# Patient Record
Sex: FEMALE | Race: WHITE | NOT HISPANIC OR LATINO | URBAN - METROPOLITAN AREA
[De-identification: names, ages, dates, MRNs, and addresses within clinical notes are randomized per-mention and may not be internally consistent; named-entity substitution may affect disease eponyms.]

---

## 2023-02-07 NOTE — PROGRESS NOTES
Would like to discuss birth control  [facility] and would [facility]    LMP: 2023   Menarche age: 14    BC: Would like to discuss OCP (Eric Tolentino)

## 2023-02-08 ENCOUNTER — OFFICE VISIT (OUTPATIENT)
Dept: OBGYN CLINIC | Age: 23
End: 2023-02-08

## 2023-02-08 VITALS
BODY MASS INDEX: 17.86 KG/M2 | DIASTOLIC BLOOD PRESSURE: 68 MMHG | HEIGHT: 61 IN | SYSTOLIC BLOOD PRESSURE: 102 MMHG | WEIGHT: 94.6 LBS

## 2023-02-08 DIAGNOSIS — Z30.41 ENCOUNTER FOR SURVEILLANCE OF CONTRACEPTIVE PILLS: ICD-10-CM

## 2023-02-08 DIAGNOSIS — Z30.09 BIRTH CONTROL COUNSELING: Primary | ICD-10-CM

## 2023-02-08 RX ORDER — NORETHINDRONE ACETATE AND ETHINYL ESTRADIOL 1MG-20(21)
1 KIT ORAL DAILY
COMMUNITY
End: 2023-02-08 | Stop reason: SDUPTHER

## 2023-02-08 RX ORDER — NORETHINDRONE ACETATE AND ETHINYL ESTRADIOL 1MG-20(21)
1 KIT ORAL DAILY
Qty: 84 TABLET | Refills: 0 | Status: SHIPPED | OUTPATIENT
Start: 2023-02-08

## 2023-02-08 NOTE — PROGRESS NOTES
Assessment/Plan:    Encounter for surveillance of contraceptive pills  -Discussed BC options  Patient is happy with her current pills and would like to continue  Discussed directions of use, risks, benefits, and SEs of OCPs  ACHES reviewed   -advised to take pills at the same time every day and not to miss doses  -f/u in 3 months for pill check and pap  -HPV vaccine recommended if not already completed  Diagnoses and all orders for this visit:    Birth control counseling    Encounter for surveillance of contraceptive pills  -     norethindrone-ethinyl estradiol (Tarina FE 1/20) 1-20 MG-MCG per tablet; Take 1 tablet by mouth daily       Subjective:      Patient ID: Monroe Jensen is a 25 y o  female here for contraception counseling  [facility] and would [facility]  She has been on OCPs for 1 month now  Sexually active  Declined STD screening today  Her menstrual cycles are regular without issue  She is a current every day smoker (vapes) with nicotine cartridge  Personal and family history were reviewed and she is without any additional VTE risk factors  She has never had a pap smear  Unsure if she ever completed the Gardasil vaccine series  Patient's last menstrual period was 02/04/2023 (exact date)  The following portions of the patient's history were reviewed and updated as appropriate:   She  has no past medical history on file  She   Patient Active Problem List    Diagnosis Date Noted   • Encounter for surveillance of contraceptive pills 02/08/2023     She  has a past surgical history that includes Foot surgery (Right)  Her family history is not on file  She  reports that she has never smoked  She has never used smokeless tobacco  She reports current alcohol use  She reports that she does not use drugs    Current Outpatient Medications   Medication Sig Dispense Refill   • norethindrone-ethinyl estradiol (Tarina FE 1/20) 1-20 MG-MCG per tablet Take 1 tablet by mouth daily 84 tablet 0     No current facility-administered medications for this visit  She has No Known Allergies       Review of Systems   Constitutional: Negative for chills and fever  Respiratory: Negative for shortness of breath  Cardiovascular: Negative for chest pain and leg swelling  Gastrointestinal: Negative for abdominal pain  Genitourinary: Negative for menstrual problem and pelvic pain  Musculoskeletal: Negative for back pain and myalgias  Skin: Negative for pallor and rash  Neurological: Negative for dizziness, light-headedness and headaches  Hematological: Negative for adenopathy  Psychiatric/Behavioral: Negative for confusion  Objective:      /68 (BP Location: Left arm, Patient Position: Sitting, Cuff Size: Standard)   Ht 5' 1" (1 549 m)   Wt 42 9 kg (94 lb 9 6 oz)   LMP 02/04/2023 (Exact Date)   BMI 17 87 kg/m²          Physical Exam  Vitals and nursing note reviewed  Constitutional:       General: She is not in acute distress  Appearance: Normal appearance  She is not ill-appearing  HENT:      Head: Normocephalic and atraumatic  Eyes:      Conjunctiva/sclera: Conjunctivae normal    Pulmonary:      Effort: Pulmonary effort is normal    Abdominal:      Palpations: Abdomen is soft  Tenderness: There is no abdominal tenderness  Musculoskeletal:         General: Normal range of motion  Cervical back: Neck supple  Skin:     General: Skin is warm and dry  Neurological:      General: No focal deficit present  Mental Status: She is alert     Psychiatric:         Mood and Affect: Mood normal          Behavior: Behavior normal

## 2023-02-08 NOTE — ASSESSMENT & PLAN NOTE
-Discussed BC options  Patient is happy with her current pills and would like to continue  Discussed directions of use, risks, benefits, and SEs of OCPs  ACHES reviewed   -advised to take pills at the same time every day and not to miss doses  -f/u in 3 months for pill check and pap  -HPV vaccine recommended if not already completed

## 2023-02-08 NOTE — PATIENT INSTRUCTIONS
Birth Control Pills   WHAT YOU NEED TO KNOW:   What are birth control pills? Birth control pills are also called oral contraceptives, or the pill  It is medicine that helps prevent pregnancy by stopping ovulation  Ovulation is when the ovaries make and release an egg cell each month  If this egg gets fertilized by sperm, pregnancy occurs  You will need to take the pill at the same time every day  Your healthcare provider will tell you when to start taking the pill  You will also be told what to do if you miss a dose  Instructions will depend on the kind of birth control pills you are taking  What are the different kinds of birth control pills? Some kinds are taken for 21 days in a row, followed by 7 days of placebo (no hormones) pills  Other kinds are taken for 24 days followed by 4 days of placebos  Each kind has a certain amount of female hormones  Your provider will decide on the kind that is best for you based on your age and other health conditions  What may be done before I can start taking birth control pills? You need to see your healthcare provider to get a prescription  Any of the following may be done before your healthcare provider gives you a prescription:  Your healthcare provider will ask about diseases and illnesses you have had in the past  Your provider will check your risk for blood clots, heart conditions, or stroke  Tell your provider if you had gastric bypass surgery  This surgery can affect the way your body absorbs medicines such as birth control pills  Your provider will also check your blood pressure, and may do a breast and pelvic exam  A Pap smear may also be done during the pelvic exam  This is a test to make sure you do not have abnormal changes on your cervix  You may need other tests, such as a urine test to make sure you are not pregnant  Your provider will ask if you take any medicines and if you smoke   Smoking increases your risk for stroke, heart attack, or a blood clot in your lungs  If you smoke, you should not take certain kinds of birth control pills  What are the advantages of birth control pills? When birth control pills are used correctly, the chances of getting pregnant are very low  Birth control pills may help decrease bleeding and pain during your monthly period  They may also help prevent cancer of the uterus and ovaries  What are the disadvantages of birth control pills? You may have sudden changes in your mood or feelings while you take birth control pills  You may have nausea and a decreased sex drive  You may have an increased appetite and rapid weight gain  You may also have bleeding in between periods, less frequent periods, vaginal dryness, and breast pain  Birth control pills will not protect you from sexually transmitted infections  Rarely, some birth control pills can increase your risk for a blood clot  This may become life-threatening  What should I do if I decide I want to get pregnant? If you are planning to have a baby, ask your healthcare provider when you may stop taking your birth control pills  It may take some time for you to start ovulating again  Ask your healthcare provider for more information about pregnancy after birth control pills  When should I start taking birth control pills after I have a baby? If you are not breastfeeding, you may start taking birth control pills 3 weeks after you give birth  You may be able to take certain types of birth control pills if you are breastfeeding  These pills can be started from 6 weeks to 6 months after you give birth  Ask your healthcare provider for more information about when to start taking birth control pills after you give birth  What do I need to know about birth control pills and menopause? Talk with your healthcare provider if you want to take birth control pills around menopause  Around age 39, you will enter into perimenopause   This means your hormone levels are dropping and you are ovulating less often  You can still become pregnant during this time  The risk for problems, such as miscarriage, are higher if you become pregnant after age 39  Birth control pills will prevent pregnancy, and may also help prevent or relieve some signs and symptoms of menopause  Examples are hot flashes and mood swings  Your provider will do tests when you are around age 48  The tests may show that you are in menopause  If the tests do not show menopause for sure, you may be able to continue taking the pill up to age 54  The decision will depend on your health and if you have any medical conditions, such as a blood clot  Call your local emergency number (911 in the [address]) for any of the following: You have any of the following signs of a stroke:      Numbness or drooping on one side of your face     Weakness in an arm or leg    Confusion or difficulty speaking    Dizziness, a severe headache, or vision loss    You feel lightheaded, short of breath, and have chest pain  You cough up blood  When should I seek immediate care? Your arm or leg feels warm, tender, and painful  It may look swollen and red  You have severe pain, numbness, or swelling in your arms or legs  When should I call my doctor? You have forgotten to take a birth control pill  You have mood changes, such as depression, since starting birth control pills  You have nausea or are vomiting  You have severe abdominal pain  You missed a period and have questions or concerns about being pregnant  You still have bleeding 4 months after taking birth control pills correctly  You have questions or concerns about your condition or care  CARE AGREEMENT:   You have the right to help plan your care  Learn about your health condition and how it may be treated  Discuss treatment options with your healthcare providers to decide what care you want to receive  You always have the right to refuse treatment   The above information is an  only  It is not intended as medical advice for individual conditions or treatments  Talk to your doctor, nurse or pharmacist before following any medical regimen to see if it is safe and effective for you  © Copyright [employer] 2022 Information is for End User's use only and may not be sold, redistributed or otherwise used for commercial purposes  All illustrations and images included in CareNotes® are the copyrighted property of [employer]  or [employer]  HPV (Human Papillomavirus)   AMBULATORY CARE:   Human Papillomavirus (HPV)  is the most common infection spread by sexual contact  It can also be spread from a mother to her baby during delivery  HPV may cause oral and genital warts or tumors in your nose, mouth, throat, and lungs  HPV may also cause vaginal, penile, and anal cancers  You may not show symptoms of any of these conditions for several years after being exposed to HPV  Common symptoms include the following:   Painless warts    Genital or anal discharge, bleeding, itching, or pain    Pain when you urinate    Call your doctor if:   You have warts in your genital or anal area  You have genital or anal discharge, bleeding, itching, or pain  You have pain when you urinate  You have questions or concerns about your condition or care  HPV diagnosis:  Your healthcare provider may use a vinegar liquid to help diagnose HPV genital warts  Women 27to 72years old can be checked for HPV during regular cervical cancer screenings  An HPV test checks for certain types of HPV that can cause changes in cervical cells  Without treatment, the changed cells can become cancer  An HPV test can be done every 5 years if the results show no infection  The test can be done with or without a Pap smear  A Pap smear checks for cancer or for abnormal cells that can become cancer   You may be tested for HPV if you are diagnosed with a mouth or throat cancer  Treatment:  HPV cannot be cured  Conditions caused by HPV can be treated  You will need to be monitored closely for these conditions  Ask your healthcare provider for more information about monitoring, conditions caused by HPV, and available treatments  Prevent HPV infection:   Ask about the HPV vaccine  The vaccine can help protect against HPV infection  Females and males can receive the vaccine  It is most effective if given before sexual activity begins  This allows the body to build almost complete protection against HPV before contact with the virus  The vaccine is usually given at 6or 15years of age but may be given as early as 5 years  The vaccine can be given through age 39  Always use a condom during intercourse  A condom will not completely protect you from HPV infection, but it will help lower your risk  Use a new condom or latex barrier each time you have sex  This includes oral, vaginal, and anal sex  Make sure the condom fits and is put on correctly  Rubber latex sheets or dental dams can be used for oral sex  If you are allergic to latex, use a nonlatex product such as polyurethane  Follow up with your healthcare provider as directed:  Write down your questions so you remember to ask them during your visits  © Copyright [employer] 2022 Information is for End User's use only and may not be sold, redistributed or otherwise used for commercial purposes  All illustrations and images included in CareNotes® are the copyrighted property of [employer]  or [employer]  The above information is an  only  It is not intended as medical advice for individual conditions or treatments  Talk to your doctor, nurse or pharmacist before following any medical regimen to see if it is safe and effective for you

## 2023-05-09 NOTE — PROGRESS NOTES
Roberto Prasad  2000    Assessment and Plan:  Yearly exam    Encounter for annual routine gynecological examination  Normal findings on routine gyn exam  ASCCP guidelines reviewed  Pap collected today  Safe sex practices and condom use encouraged  SBE encouraged  CBE annually  Daily exercise and healthy diet with adequate calcium and vitamin D encouraged  Weight bearing exercises a minimum of 150 minutes/week advised  Advised to call with any issues, all concerns & questions addressed  See provided information in your after visit summary     HPV vaccine counseling  Gardasil 9 was advised for prevention of HPV-related disease  We discussed risks/benefits, SE's/AE's at length and all questions were answered  Written info was provided for review  The patient agrees to consider and is aware she may call to schedule injection #1 at any time  Encounter for surveillance of contraceptive pills  Happy with current OCPs  Refills sent  Diagnoses and all orders for this visit:    Encounter for annual routine gynecological examination    Encounter for surveillance of contraceptive pills  -     norethindrone-ethinyl estradiol (Tarina FE ) 1-20 MG-MCG per tablet; Take 1 tablet by mouth daily    Screening for cervical cancer  -     Liquid-based pap, screening      F/U Annually and PRN      Health Maintenance:    Last PAP: Not on file  Next PAP Due:collected today    Gardasil Vaccine Series: She has not had the Gardasil series  Subjective    CC: Yearly Exam     Roberto Prasad is a 25 y o  female  here for an annual exam       Menarche: 14    Patient's last menstrual period was 2023 (approximate)  Sexual activity: She is sexually active without pain, bleeding or dryness  Contraception: OCPs  STD testing:  She does not want STD testing today  vapes smoker, occasional drinker  Exercise- not currently    Her menstrual cycles are regular every 28-30 days   She denies any issues with bleeding or her menses  She denies breast concerns, abnormal vaginal discharge, vaginal itching, odor, irritation, bowel/bladder dysfunction, urinary symptoms, pelvic pain, or dyspareunia today  Family hx of breast cancer: No  Family hx of ovarian cancer: No  Family hx of colon cancer: No    No past medical history on file  Past Surgical History:   Procedure Laterality Date   • FOOT SURGERY Right        Immunization History   Administered Date(s) Administered   • DTaP 2002, 04/15/2002, 2003, 10/12/2005, 10/04/2012   • Hep B, adult 2001, 2001, 2002   • HiB 2001, 2002, 04/15/2002, 2003   • IPV 2001, 2002, 2003, 10/12/2005   • MMR 10/31/2002, 10/20/2006   • Meningococcal MCV4P 10/04/2012, 2019   • Pneumococcal Conjugate 13-Valent 2001, 2002, 2002, 2002   • Varicella 10/31/2002, 10/04/2012       Family History   Problem Relation Age of Onset   • Breast cancer Neg Hx    • Colon cancer Neg Hx    • Ovarian cancer Neg Hx    • Uterine cancer Neg Hx    • Cervical cancer Neg Hx      Social History     Tobacco Use   • Smoking status: Never   • Smokeless tobacco: Never   • Tobacco comments:     VAPES - NICOTINE   Vaping Use   • Vaping Use: Every day   • Substances: Nicotine   Substance Use Topics   • Alcohol use: Yes   • Drug use: Never       Current Outpatient Medications:   •  norethindrone-ethinyl estradiol (Tarina FE ) 1-20 MG-MCG per tablet, Take 1 tablet by mouth daily, Disp: 84 tablet, Rfl: 0  Patient Active Problem List    Diagnosis Date Noted   • Encounter for surveillance of contraceptive pills 2023       No Known Allergies    OB History    Para Term  AB Living   0 0 0 0 0 0   SAB IAB Ectopic Multiple Live Births   0 0 0 0 0       There were no vitals filed for this visit  There is no height or weight on file to calculate BMI        Review of Systems   Constitutional: Negative for chills, fatigue, fever and unexpected weight change  Respiratory: Negative for shortness of breath  Cardiovascular: Negative for chest pain  Gastrointestinal: Negative for abdominal pain, constipation, diarrhea, nausea and vomiting  Genitourinary: Negative for difficulty urinating, dyspareunia, dysuria, frequency, genital sores, hematuria, menstrual problem, pelvic pain, urgency, vaginal bleeding, vaginal discharge and vaginal pain  Musculoskeletal: Negative for back pain and myalgias  Skin: Negative for pallor and rash  Neurological: Negative for dizziness, light-headedness and headaches  Hematological: Negative for adenopathy  Psychiatric/Behavioral: Negative for dysphoric mood  Physical Exam  Constitutional:       General: She is not in acute distress  Appearance: Normal appearance  She is not ill-appearing  Genitourinary:      No lesions in the vagina  Right Labia: No rash, tenderness, lesions or skin changes  Left Labia: No tenderness, lesions, skin changes or rash  No inguinal adenopathy present in the right or left side  No vaginal discharge, erythema, tenderness, bleeding or ulceration  Right Adnexa: not tender, not full and no mass present  Left Adnexa: not tender, not full and no mass present  Cervix is nulliparous  No cervical motion tenderness, discharge, friability, lesion, polyp or nabothian cyst       Uterus is not enlarged, fixed or tender  No uterine mass detected  Uterus is midaxial       Urethral meatus caruncle not present  No urethral prolapse, tenderness or discharge present  Bladder is not tender and urgency on palpation not present  Pelvic exam was performed with patient in the lithotomy position  Breasts:     Right: No swelling, inverted nipple, mass, nipple discharge, skin change or tenderness  Left: No swelling, inverted nipple, mass, nipple discharge, skin change or tenderness     HENT: Head: Normocephalic and atraumatic  Eyes:      Conjunctiva/sclera: Conjunctivae normal    Pulmonary:      Effort: Pulmonary effort is normal    Abdominal:      General: There is no distension  Palpations: Abdomen is soft  Tenderness: There is no abdominal tenderness  Musculoskeletal:         General: Normal range of motion  Cervical back: Neck supple  Lymphadenopathy:      Upper Body:      Right upper body: No supraclavicular or axillary adenopathy  Left upper body: No supraclavicular or axillary adenopathy  Lower Body: No right inguinal adenopathy  No left inguinal adenopathy  Neurological:      Mental Status: She is alert and oriented to person, place, and time  Skin:     General: Skin is warm and dry  Psychiatric:         Mood and Affect: Mood normal          Behavior: Behavior normal          Thought Content: Thought content normal          Judgment: Judgment normal    Vitals and nursing note reviewed

## 2023-05-10 ENCOUNTER — ANNUAL EXAM (OUTPATIENT)
Age: 23
End: 2023-05-10

## 2023-05-10 VITALS
SYSTOLIC BLOOD PRESSURE: 102 MMHG | DIASTOLIC BLOOD PRESSURE: 72 MMHG | WEIGHT: 95.4 LBS | HEIGHT: 61 IN | BODY MASS INDEX: 18.01 KG/M2

## 2023-05-10 DIAGNOSIS — Z12.4 SCREENING FOR CERVICAL CANCER: ICD-10-CM

## 2023-05-10 DIAGNOSIS — Z01.419 ENCOUNTER FOR ANNUAL ROUTINE GYNECOLOGICAL EXAMINATION: Primary | ICD-10-CM

## 2023-05-10 DIAGNOSIS — Z30.41 ENCOUNTER FOR SURVEILLANCE OF CONTRACEPTIVE PILLS: ICD-10-CM

## 2023-05-10 PROBLEM — Z71.85 HPV VACCINE COUNSELING: Status: ACTIVE | Noted: 2023-05-10

## 2023-05-10 RX ORDER — NORETHINDRONE ACETATE AND ETHINYL ESTRADIOL 1MG-20(21)
1 KIT ORAL DAILY
Qty: 84 TABLET | Refills: 4 | Status: SHIPPED | OUTPATIENT
Start: 2023-05-10

## 2023-05-10 NOTE — PATIENT INSTRUCTIONS
Pap Smear   AMBULATORY CARE:   A Pap smear,  or Pap test, is used to screen for cervical cancer  It is also used to find precancerous and cancerous cells of the vulva and vagina  Prepare for a Pap smear: The best time to schedule the test is right after your period stops  Do not have a Pap smear during your monthly period  During a Pap smear:   You will lie on your back and place your feet on footrests called stirrups  Your healthcare provider will gently insert a device called a speculum into your vagina  The speculum is used to open the walls of your vagina so your provider can see your cervix  Your provider will gently take cell samples from your cervix and vagina  The samples are placed in a container with liquid or on a glass slide  They are sent to a lab and examined for abnormal cells  A test for human papillomavirus (HPV) may be done at the same time  HPV is a sexually transmitted virus that can cause changes in cervical cells  After a Pap smear:  You may have some spotting the day of your procedure  Test results: Your healthcare provider will tell you when you can expect your Pap smear results  It usually takes about 1 to 3 weeks  Normal results  mean that no cell changes were found on your cervix  You may be able to wait 3 to 5 years for your next Pap smear exam     Unclear results  may mean they did not get a good sample of cervical tissue  Or, it may mean there are changes in your cells that look abnormal  This could be due to an infection, pregnancy, menopause, or HPV  You may need another Pap smear in 1 year  Your healthcare provider will tell you what to do next  Abnormal results  mean that cell changes were found on your cervix  This does not mean you have cervical cancer  It could mean you have inflammation or an infection  It could also mean you have HPV or cells that might develop into cancer   Your healthcare provider will explain the abnormal test result to you and go over next steps with you  He or she may recommend a colposcopy  During this procedure, a small scope with a light is used to look more closely at your cervix and vagina  How often to get a Pap smear:  Pap smears usually start at age 24 and continue until age 72  A Pap smear alone may be done every 3 years  An HPV test alone or with a Pap smear may be done every 5 years, starting at age 27  You may need Pap smears more often or after age 72 if you have any of the following:  Abnormal Pap smear result    Positive HPV test result    A history of cervical cancer, or a high risk for cervical cancer    HIV    A weak immune system    Exposure to diethylstilbestrol (NNEKA) medicine when your mother was pregnant with you    Call your doctor if:   You have severe bleeding  It has been 3 weeks and you do not have test results  You have questions or concerns about your condition or care  © Copyright Sonny King 2022 Information is for End User's use only and may not be sold, redistributed or otherwise used for commercial purposes  The above information is an  only  It is not intended as medical advice for individual conditions or treatments  Talk to your doctor, nurse or pharmacist before following any medical regimen to see if it is safe and effective for you  Birth Control Pills   WHAT YOU NEED TO KNOW:   What are birth control pills? Birth control pills are also called oral contraceptives, or the pill  It is medicine that helps prevent pregnancy by stopping ovulation  Ovulation is when the ovaries make and release an egg cell each month  If this egg gets fertilized by sperm, pregnancy occurs  You will need to take the pill at the same time every day  Your healthcare provider will tell you when to start taking the pill  You will also be told what to do if you miss a dose  Instructions will depend on the kind of birth control pills you are taking  What are the different kinds of birth control pills?   Some kinds are taken for 21 days in a row, followed by 7 days of placebo (no hormones) pills  Other kinds are taken for 24 days followed by 4 days of placebos  Each kind has a certain amount of female hormones  Your provider will decide on the kind that is best for you based on your age and other health conditions  What may be done before I can start taking birth control pills? You need to see your healthcare provider to get a prescription  Any of the following may be done before your healthcare provider gives you a prescription:  Your healthcare provider will ask about diseases and illnesses you have had in the past  Your provider will check your risk for blood clots, heart conditions, or stroke  Tell your provider if you had gastric bypass surgery  This surgery can affect the way your body absorbs medicines such as birth control pills  Your provider will also check your blood pressure, and may do a breast and pelvic exam  A Pap smear may also be done during the pelvic exam  This is a test to make sure you do not have abnormal changes on your cervix  You may need other tests, such as a urine test to make sure you are not pregnant  Your provider will ask if you take any medicines and if you smoke  Smoking increases your risk for stroke, heart attack, or a blood clot in your lungs  If you smoke, you should not take certain kinds of birth control pills  What are the advantages of birth control pills? When birth control pills are used correctly, the chances of getting pregnant are very low  Birth control pills may help decrease bleeding and pain during your monthly period  They may also help prevent cancer of the uterus and ovaries  What are the disadvantages of birth control pills? You may have sudden changes in your mood or feelings while you take birth control pills  You may have nausea and a decreased sex drive  You may have an increased appetite and rapid weight gain   You may also have bleeding in between periods, less frequent periods, vaginal dryness, and breast pain  Birth control pills will not protect you from sexually transmitted infections  Rarely, some birth control pills can increase your risk for a blood clot  This may become life-threatening  What should I do if I decide I want to get pregnant? If you are planning to have a baby, ask your healthcare provider when you may stop taking your birth control pills  It may take some time for you to start ovulating again  Ask your healthcare provider for more information about pregnancy after birth control pills  When should I start taking birth control pills after I have a baby? If you are not breastfeeding, you may start taking birth control pills 3 weeks after you give birth  You may be able to take certain types of birth control pills if you are breastfeeding  These pills can be started from 6 weeks to 6 months after you give birth  Ask your healthcare provider for more information about when to start taking birth control pills after you give birth  What do I need to know about birth control pills and menopause? Talk with your healthcare provider if you want to take birth control pills around menopause  Around age 39, you will enter into perimenopause  This means your hormone levels are dropping and you are ovulating less often  You can still become pregnant during this time  The risk for problems, such as miscarriage, are higher if you become pregnant after age 39  Birth control pills will prevent pregnancy, and may also help prevent or relieve some signs and symptoms of menopause  Examples are hot flashes and mood swings  Your provider will do tests when you are around age 48  The tests may show that you are in menopause  If the tests do not show menopause for sure, you may be able to continue taking the pill up to age 54  The decision will depend on your health and if you have any medical conditions, such as a blood clot      Call your local emergency number (911 in the [address]) for any of the following: You have any of the following signs of a stroke:      Numbness or drooping on one side of your face     Weakness in an arm or leg    Confusion or difficulty speaking    Dizziness, a severe headache, or vision loss    You feel lightheaded, short of breath, and have chest pain  You cough up blood  When should I seek immediate care? Your arm or leg feels warm, tender, and painful  It may look swollen and red  You have severe pain, numbness, or swelling in your arms or legs  When should I call my doctor? You have forgotten to take a birth control pill  You have mood changes, such as depression, since starting birth control pills  You have nausea or are vomiting  You have severe abdominal pain  You missed a period and have questions or concerns about being pregnant  You still have bleeding 4 months after taking birth control pills correctly  You have questions or concerns about your condition or care  CARE AGREEMENT:   You have the right to help plan your care  Learn about your health condition and how it may be treated  Discuss treatment options with your healthcare providers to decide what care you want to receive  You always have the right to refuse treatment  The above information is an  only  It is not intended as medical advice for individual conditions or treatments  Talk to your doctor, nurse or pharmacist before following any medical regimen to see if it is safe and effective for you  © Copyright Monique Schwartz 2022 Information is for End User's use only and may not be sold, redistributed or otherwise used for commercial purposes

## 2023-05-10 NOTE — PROGRESS NOTES
Patient is here today for a gynecological annual exam    No questions or concerns today    LMP: Approximately 23   Menarche age: 14    BC: OCP    Last pap: Not on file    Gardasil: Not interested

## 2023-05-10 NOTE — ASSESSMENT & PLAN NOTE
Normal findings on routine gyn exam  ASCCP guidelines reviewed  Pap collected today  Safe sex practices and condom use encouraged  SBE encouraged  CBE annually  Daily exercise and healthy diet with adequate calcium and vitamin D encouraged  Weight bearing exercises a minimum of 150 minutes/week advised  Advised to call with any issues, all concerns & questions addressed     See provided information in your after visit summary

## 2023-05-18 LAB
LAB AP GYN PRIMARY INTERPRETATION: NORMAL
Lab: NORMAL
PATH INTERP SPEC-IMP: NORMAL

## 2023-06-07 ENCOUNTER — TELEPHONE (OUTPATIENT)
Dept: OBGYN CLINIC | Facility: CLINIC | Age: 23
End: 2023-06-07

## 2023-06-07 NOTE — TELEPHONE ENCOUNTER
----- Message from Dilcia Bowling sent at 6/7/2023  8:31 AM EDT -----  Pt did not access her [employer] messages  Pap normal but showed possible BV  Is she experiencing any symptoms?

## 2023-06-08 NOTE — TELEPHONE ENCOUNTER
Pt said she sometimes has an odor and sometimes has a discharge  She would like to be rxed for bv  She is aware you are off today   [facility] [place]   Thanks

## 2023-06-09 DIAGNOSIS — B96.89 BV (BACTERIAL VAGINOSIS): Primary | ICD-10-CM

## 2023-06-09 DIAGNOSIS — N76.0 BV (BACTERIAL VAGINOSIS): Primary | ICD-10-CM

## 2023-06-09 RX ORDER — METRONIDAZOLE 7.5 MG/G
1 GEL VAGINAL DAILY
Qty: 70 G | Refills: 0 | Status: SHIPPED | OUTPATIENT
Start: 2023-06-09 | End: 2023-06-14

## 2024-02-21 PROBLEM — Z01.419 ENCOUNTER FOR ANNUAL ROUTINE GYNECOLOGICAL EXAMINATION: Status: RESOLVED | Noted: 2023-05-10 | Resolved: 2024-02-21

## 2024-04-30 DIAGNOSIS — Z30.41 ENCOUNTER FOR SURVEILLANCE OF CONTRACEPTIVE PILLS: ICD-10-CM

## 2024-05-02 RX ORDER — NORETHINDRONE ACETATE AND ETHINYL ESTRADIOL AND FERROUS FUMARATE 1MG-20(21)
1 KIT ORAL DAILY
Qty: 84 TABLET | Refills: 1 | Status: SHIPPED | OUTPATIENT
Start: 2024-05-02

## 2024-07-23 ENCOUNTER — ANNUAL EXAM (OUTPATIENT)
Age: 24
End: 2024-07-23
Payer: COMMERCIAL

## 2024-07-23 VITALS
HEIGHT: 62 IN | WEIGHT: 95.6 LBS | BODY MASS INDEX: 17.59 KG/M2 | DIASTOLIC BLOOD PRESSURE: 80 MMHG | SYSTOLIC BLOOD PRESSURE: 104 MMHG

## 2024-07-23 DIAGNOSIS — Z71.85 HPV VACCINE COUNSELING: ICD-10-CM

## 2024-07-23 DIAGNOSIS — Z30.41 ENCOUNTER FOR SURVEILLANCE OF CONTRACEPTIVE PILLS: ICD-10-CM

## 2024-07-23 DIAGNOSIS — Z01.419 ENCOUNTER FOR ANNUAL ROUTINE GYNECOLOGICAL EXAMINATION: Primary | ICD-10-CM

## 2024-07-23 PROCEDURE — 99395 PREV VISIT EST AGE 18-39: CPT | Performed by: NURSE PRACTITIONER

## 2024-07-23 RX ORDER — NORETHINDRONE ACETATE AND ETHINYL ESTRADIOL 1MG-20(21)
1 KIT ORAL DAILY
Qty: 84 TABLET | Refills: 4 | Status: SHIPPED | OUTPATIENT
Start: 2024-07-23

## 2024-07-23 NOTE — PROGRESS NOTES
Diagnoses and all orders for this visit:    Encounter for annual routine gynecological examination    Encounter for surveillance of contraceptive pills  -     norethindrone-ethinyl estradiol (Aurovela FE ) 1-20 MG-MCG per tablet; Take 1 tablet by mouth daily Skip placebo week.    HPV vaccine counseling  Comments:  Given pamphlet      Calcium/vit d inclusion in the diet discussed, call with any issues, SBE reinforced, all concerns addressed.       Pt here for Annual exam today   Pap-5/10/23  neg   Currently sexually active - yes   -  Smoker- vapes  No pelvic pain   C    Pleasant 23 y.o. premenopausal female here for annual exam. She denies any issues with heavy bleeding or her menses. She reports regular monthly cycles that last 5 days but her cramps have been worst. She wants to start skipping her placebo. Denies history of abnormal pap smears. Last Pap was done 05/10/2023 negative. A pap was NOT done today. She denies vaginal issues. She denies pelvic pain. She denies dyspareunia. She denies any issues with her BCM. She is sexually active without any concerns. Monogamous x 2 years. She has never been screened. Advised risks of untreated chlamydia and gonorrhea which include but are not limited to infertility, PID, abscess etc. Patient verbalized understanding. Declined Gardasil vaccines  today but will think about them.    History reviewed. No pertinent past medical history.  Past Surgical History:   Procedure Laterality Date    FOOT SURGERY Right      Family History   Problem Relation Age of Onset    Breast cancer Neg Hx     Colon cancer Neg Hx     Ovarian cancer Neg Hx     Uterine cancer Neg Hx     Cervical cancer Neg Hx      Social History     Tobacco Use    Smoking status: Every Day    Smokeless tobacco: Never    Tobacco comments:     VAPES - NICOTINE   Vaping Use    Vaping status: Every Day    Substances: Nicotine   Substance Use Topics    Alcohol use: Yes    Drug use: Never       Current Outpatient  "Medications:     norethindrone-ethinyl estradiol (Aurovela FE ) 1-20 MG-MCG per tablet, Take 1 tablet by mouth daily Skip placebo week., Disp: 84 tablet, Rfl: 4  Patient Active Problem List    Diagnosis Date Noted    HPV vaccine counseling 05/10/2023    Encounter for surveillance of contraceptive pills 2023       No Known Allergies    OB History    Para Term  AB Living   0 0 0 0 0 0   SAB IAB Ectopic Multiple Live Births   0 0 0 0 0     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  Patient's last menstrual period was 2024 (approximate).    Review of Systems   Constitutional: Negative for chills, fatigue, fever and unexpected weight change.   Respiratory: Negative for shortness of breath.    Gastrointestinal: Negative for anal bleeding, blood in stool, constipation and diarrhea.   Genitourinary: Negative for difficulty urinating, dysuria and hematuria.     Physical Exam   Constitutional: She appears well-developed and well-nourished. No distress.   HENT: atraumatic, EOMI  Head: Normocephalic.   Neck: Normal range of motion. Neck supple.   Pulmonary: Effort normal.  Breasts: bilateral without masses, skin changes or nipple discharge. Bilaterally soft and warm to touch. No areas of erythema or pain.  Abdominal: Soft.   Pelvic exam was performed with patient supine. No labial fusion. There is no rash, tenderness, lesion or injury on the right labia. There is no rash, tenderness, lesion or injury on the left labia. Urethral meatus does not show any tenderness, inflammation or discharge. Palpation of midline bladder without pain or discomfort.Uterus is not deviated, not enlarged, not fixed and not tender. Cervix exhibits no motion tenderness, no discharge and no friability. Right adnexum displays no mass, no " tenderness and no fullness. Left adnexum displays no mass, no tenderness and no fullness. No erythema or tenderness in the vagina. No foreign body in the vagina. No signs of injury around the vagina. No vaginal discharge found. No signs of injury around the vagina or anus. Perineum without lesions, signs of injury, erythema or swelling.  Lymphadenopathy:        Right: No inguinal adenopathy present.        Left: No inguinal adenopathy present.

## 2024-07-23 NOTE — PATIENT INSTRUCTIONS
"Patient Education     Human papillomavirus (HPV)   The Basics   Written by the doctors and editors at [facility]   What is human papillomavirus? -- Human papillomavirus, or \"HPV,\" is a virus that can cause skin warts, genital warts, and some forms of cancer.  There are many types of HPV. Different types can cause different health problems. This article is about the types of HPV that can be spread through sex.  How can people get infected with HPV? -- People can get infected with HPV if their mouth or genitals touch the genitals of someone who is infected. This mainly happens through oral, vaginal, or anal sex. But HPV can also be spread through close genital-to-genital contact, even without having sex.  People who have had a lot of sex partners have a higher chance of getting an HPV infection. Many people with HPV do not know that they have the infection. So it is easy to spread to partners without realizing it.  What are the symptoms of an HPV infection? -- Some types of HPV cause genital warts. But many people do not have any symptoms when they get infected with HPV. And often, the infection will get better on its own. But in some people, the infection doesn't go away. If this happens, it can lead to problems.  What are the risks of HPV infection? -- People with a long-lasting HPV infection have a higher chance of getting other health problems. Different types of HPV can cause different problems, some of which can be serious. For example:   An HPV infection in the genitals can cause cancer of the cervix (cervical cancer), vagina (vaginal cancer), or penis (penile cancer). Other types of HPV can cause genital warts.   An HPV infection around the anus can cause cancer of the anus (anal cancer)   An HPV infection in the mouth and throat can cause cancer of the mouth and throat  These problems usually happen many years after a person is first infected.  Is there a test for HPV? -- There are tests for some types, but not " "others. If your doctor confirms that you have genital warts, this means you have an HPV infection. This is not the same type of HPV that can lead to cancer.  Doctors recommend that anyone with a cervix (figure 1) be screened for cervical cancer. In most cases this involves getting regular Pap tests (sometimes called \"Pap smears\") starting at age 21. They can also test for HPV at this time. Sometimes, HPV testing alone is used to screen for cervical cancer.  There are no tests to check for genital HPV infection in males, or HPV infection in the mouth or throat.  How is HPV infection treated? -- Once you have HPV, it is not possible to get rid of it with medicines.  If HPV infection leads to a more serious problem, your doctor can talk to you about your treatment options.  Can HPV be prevented? -- Yes. For most people, the best way to protect against HPV is to get the HPV vaccine. The vaccine only works if it is given before a person gets infected with HPV. This is why doctors suggest getting it at a young age. Doctors recommend that children aged 11 to 12 get the vaccine. But it can be given as early as age 9 and up to age 26. In some cases, the vaccine might help older people, too.  The vaccine is very good at preventing the types of HPV infection that can cause cervical and vaginal cancer. It might lower the risk of other types of cancer, too. The vaccine is also very good at preventing the types of HPV that cause genital warts.  The vaccine is not perfect. In some cases, people who get it can still get an HPV infection. But it is still the best way to lower the risk of HPV.  Condoms do not completely protect against HPV. That's because the virus can live on skin that is not covered by a condom. But condoms are still an important way to protect yourself against other diseases that can be spread through sex.  All topics are updated as new evidence becomes available and our peer review process is complete.  This " "topic retrieved from [employer] on: Feb 26, 2024.  Topic 214731 Version 4.0  Release: 32.2.4 - C32.56  © 2024 UpToDate, Inc. and/or its affiliates. All rights reserved.  figure 1: Female reproductive anatomy     The internal organs that make up the female reproductive system are located in the lower belly. These include the uterus, fallopian tubes, ovaries, cervix, and vagina.  The uterus has an inner lining, called the \"endometrium,\" and a thicker outer layer, called the \"myometrium.\"  Graphic 37253 Version 8.0  Consumer Information Use and Disclaimer   Disclaimer: This generalized information is a limited summary of diagnosis, treatment, and/or medication information. It is not meant to be comprehensive and should be used as a tool to help the user understand and/or assess potential diagnostic and treatment options. It does NOT include all information about conditions, treatments, medications, side effects, or risks that may apply to a specific patient. It is not intended to be medical advice or a substitute for the medical advice, diagnosis, or treatment of a health care provider based on the health care provider's examination and assessment of a patient's specific and unique circumstances. Patients must speak with a health care provider for complete information about their health, medical questions, and treatment options, including any risks or benefits regarding use of medications. This information does not endorse any treatments or medications as safe, effective, or approved for treating a specific patient. UpToDate, Inc. and its affiliates disclaim any warranty or liability relating to this information or the use thereof.The use of this information is governed by the Terms of Use, available at https://www.wolterskluwer.com/en/know/clinical-effectiveness-terms. 2024© UpToDate, Inc. and its affiliates and/or licensors. All rights reserved.  Copyright   © 2024 UpToDate, Inc. and/or its affiliates. All rights " "reserved.  Patient Education     HPV (Human Papillomavirus) Vaccine CDC Vaccine Information Statement (VIS)   About this topic       Patient Education     Hormonal birth control   The Basics   Written by the doctors and editors at [facility]   What is hormonal birth control? -- This is any pill, injection, device, or treatment that uses hormones to prevent pregnancy. There are a few different kinds of hormonal birth control. Some contain the hormones estrogen and progestin. Others contain only progestin.  While no birth control works 100 percent perfectly all of the time, hormonal methods work very well to prevent pregnancy. The methods differ in how easy they are to use and their side effects (table 1):   Pills - If you choose birth control pills, you need to take a pill every day. Skipping pills can increase the chance of getting pregnant. Birth control pill packets usually include 4 to 7 days of hormone-free pills each month. You get your period during these hormone-free days. If you prefer not to get a period, you can skip the hormone-free pills and take a hormone pill every day instead. This is called \"continuous dosing.\"  Most birth control pills contain estrogen and progestin, but some contain only progestin. One progestin-only pill (brand name: Opill) is available without a prescription in the [place].   Skin patches - There are a few different patches available (brand names: Xulane, Xafemy, Twirla). You can wear the patch on your shoulder, back, belly, or hip (figure 1). Some can also be worn on the upper arm. You change the patch once a week, and you put it in a new place each time. You typically wear a new patch each week for 3 weeks and then leave the patch off during week 4. You get your period during week 4. Skin patches for birth control contain both estrogen and progestin.   Vaginal rings - This is a flexible ring you put in your vagina (sample brand names: Annovera, NuvaRing) (figure 2). The ring releases " "the hormones estrogen and progestin.  Do not remove the ring when you have sex. You need to check before and after sex to make sure that it is in place. If the ring comes out, make sure that you know how quickly you need to put it back in. This depends on which brand of ring you have. In general, if it comes out for more than a few hours, you need to use another form of birth control to prevent pregnancy.  You typically keep the ring in for 3 weeks. Then, depending on which ring you have, you either throw it away or clean it and store it to put back in later. You do not use the ring during week 4, which is when you have your period.  You can choose to continue using a ring for longer than 3 weeks. If so, you will not have a regular period, although you might have some light bleeding or \"spotting.\"   Injections - If you use hormone injections, you will get a shot in the arm or buttocks every 3 months. Injections for birth control (brand name: Depo-Provera) contain only progestin.   Implants - A birth control implant is a tiny may that releases hormones in the arm (figure 3). It must be implanted by a doctor or nurse and can stay in the arm for up to 3 years. Implants for birth control (brand name: Nexplanon) contain only progestin.   Hormone-containing intrauterine device (\"IUD\") - This device is placed inside the uterus to prevent pregnancy (figure 4). Some IUDs work by releasing hormones into the body (brand names: Mirena, Liletta, Kyleena, Rani). Depending on which hormone-releasing IUD you get and your age, it can stay in place for 3 to 8 years. The hormone-releasing IUDs contain the hormone levonorgestrel, which is a progestin.  Hormonal birth control is a safe and reliable way to prevent pregnancy for most people. But it does not protect you from infections that spread through sex (called \"sexually transmitted infections\" or \"sexually transmitted diseases\").  Why else might a person use hormonal birth control? " "-- Hormonal birth control has other benefits besides preventing pregnancy. It can make your periods lighter or more regular. For this reason, it is also often used in the treatment of certain health conditions, including:   Heavy, irregular, or painful periods - Different things can affect your monthly period. Some people also get painful cramps or other symptoms.   Polycystic ovary syndrome (\"PCOS\") - This condition can cause irregular periods, acne, extra facial hair, or hair loss from the head.   Menstrual migraines - These are migraine headaches that are triggered by hormone changes around the monthly period. Hormonal birth control might be an option for treatment, as long as you do not get migraines with an \"aura.\" An aura is a symptom or feeling that happens before or during the headache. For example, some people see flashing lights, bright spots, or zig-zag lines, or lose part of their vision.  If you have any of these conditions, your doctor or nurse might suggest the pill or another form of hormonal birth control. Do not try using birth control to treat a health condition without talking to your doctor or nurse first.  How do I choose the right hormonal birth control for me? -- Work with your doctor or nurse to choose the best option for you. Think about how likely you are to use each method the right way. Can you remember to take a pill every day? Can you remember to change a patch once a week? Long-acting methods (IUD, implant) are the most convenient because they work for 3 to 10 years, depending on the method. The injection, which works for 3 months, might be more convenient than the pill, patch, or ring. Also, ask your doctor how the method you are thinking about will affect your period. The table has a list of side effects and risks for each of the different forms (table 1).  Is hormonal birth control safe for everyone? -- No. Some people should not use estrogen-containing hormonal birth control. This " "includes those who:   Are age 35 or older and smoke cigarettes - These things increase your risk for heart attacks and strokes.   Could possibly be pregnant - Before prescribing hormonal birth control, your doctor or nurse will ask questions to make sure that you are not pregnant. You might need to take a pregnancy test to confirm this.   Have had blood clots or a stroke in the past   Are being treated for breast cancer, or have had breast cancer before   Have some types of liver disease - Hormonal birth control can make some types of liver disease worse.   Have some types of heart disease   Get the type of migraine headaches that cause vision or hearing problems  If you have high blood pressure, you can still use hormonal birth control. But your blood pressure needs to be well controlled and regularly checked by a doctor.  Many people who can't take estrogen-containing hormonal birth control can take other kinds of hormonal birth control that contain only progestin. Or they can use methods that do not contain hormones.  What if I take medicines besides birth control? -- Some medicines can affect how well hormonal birth control works. These include:   Some medicines used to prevent seizures (called \"anticonvulsants\")   Certain antibiotics used to treat tuberculosis (rifampin and rifabutin)   [place]'s Wort (an herbal medicine for depression)  If you take any of these medicines, talk to your doctor about how to handle birth control. Also, if you already take hormonal birth control, tell any doctor or nurse who might be prescribing medicines for you.  What if I forget to use my hormonal birth control? -- If you have sex and forgot to use your birth control, you can take emergency contraception to reduce your risk of pregnancy. Some forms of emergency contraception require a prescription, but you can buy others in a pharmacy. The IUD is the most effective method of emergency contraception, but requires a doctor or " nurse to insert it. If you need to use emergency contraception, do it as soon as possible after sex.  All topics are updated as new evidence becomes available and our peer review process is complete.  This topic retrieved from [employer] on: Mar 20, 2024.  Topic 69917 Version 21.0  Release: 32.2.4 - C32.78  © 2024 UpToDate, Inc. and/or its affiliates. All rights reserved.  table 1: Hormonal birth control  Method  Using the method  Some side effects and risks    Implantable may   Hormones it contains: Progestin  Expected pregnancies per 100 people in first year of use: Less than 1 Must be inserted by a doctor.  Nothing to do or remember.  Lasts up to 3 years. Most common side effects:   Bleeding between periods  Changes in periods  Other possible side effects:  Headache  Acne  Sore breasts  Weight gain  Mood changes   IUD with progestin   Hormones it contains: Progestin  Expected pregnancies per 100 people in first year of use: Less than 1 Must be inserted by a doctor.  Nothing to do or remember.  Lasts 3 to 8 years depending on type. Most common side effects:   Bleeding between periods  Regular periods may stop  Other possible side effects:  Cramps  Potential but uncommon risks:  IUD can slip out  IUD can damage the uterus  Insertion of IUD can lead to a type of infection that can make it hard to get pregnant after the IUD is removed   Shot/injection   Hormones it contains: Progestin  Expected pregnancies per 100 people in first year of use: 4 to 7 Doctor or nurse must give you a shot every 3 months. Most common side effects:   Bleeding between periods  Regular periods may stop  Other possible side effects:  Temporary bone thinning  Weight gain  Mood changes  Hair loss or increased hair on face or body  Sore breasts  Headache   Progestin-only pill   Hormones it contains: Progestin  Expected pregnancies per 100 people in first year of use: 4 to 7 You must swallow a pill at the same time every day. Most common side  effects:   Bleeding between periods (this is more common with progestin-only pills than with combined estrogen-progestin pills)  Other possible side effects:  Sore breasts  Acne   Combination estrogen-progestin pill   Hormones it contains: Progestin and estrogen  Expected pregnancies per 100 people in first year of use: 4 to 7 You must swallow a pill every day. Most common side effects:   Bleeding between periods  Decreased bleeding during period  Other possible side effects:  Nausea  Changes in mood  Rare but serious risks include:  Heart attack  Stroke  Blood clots  High blood pressure  Liver tumors  Gallstones  Yellowing of the skin or eyes (jaundice)   Patch   Hormones it contains: Progestin and estrogen  Expected pregnancies per 100 people in first year of use: 4 to 7 You must wear a patch on your skin all of the time for 3 weeks. Every week, you change the patch. During the 4th week, you do not use a patch. Side effects and risks are similar to those of combined estrogen-progestin pills, but the patch causes higher average levels of estrogen than most pills. This may increase the risk of blood clots.  Other possible side effects:  Skin irritation where the patch is applied   Vaginal ring   Hormones it contains: Progestin and estrogen  Expected pregnancies per 100 people in first year of use: 4 to 7 You must put the ring into your vagina and leave it in for 3 weeks. During the 4th week, you do not use a ring. Side effects and risks are similar to those of the combined estrogen-progestin pill. (Breast soreness and nausea may be less than with the pill.)  Other possible side effects:  Vaginal discharge or irritation   The methods listed here all require a prescription. This table applies to people without medical problems, such as a history of cancer, blood clots, or liver disease. If you have any medical problems, ask your doctor or nurse whether you should avoid any type of hormonal birth control. If you are  breastfeeding, tell your doctor or nurse when you choose birth control.  IUD: intrauterine device.  Graphic 13727 Version 8.0  figure 1: Birth control skin patch     This picture shows a birth control patch on the upper back. You can wear the patch on your shoulder, back, belly, or hip. One type of patch can also be worn on the upper arm.  The skin patch delivers hormones into the body that help prevent pregnancy.  Graphic 11970 Version 9.0  figure 2: Vaginal ring     This picture shows the birth control ring. It is a flexible ring that you put in your vagina for 3 weeks at a time. It delivers hormones into the body that help prevent pregnancy.  Graphic 83584 Version 5.0  figure 3: Birth control implant     This picture shows the shape and size of the birth control implant. It is implanted into the upper arm, where it releases hormones that help prevent pregnancy.  Graphic 89068 Version 5.0  figure 4: IUDs     This picture shows 2 types of IUDs. There are different IUDs available. They are placed inside the uterus to help prevent pregnancy. Some hormonal IUDs can help reduce menstrual bleeding. The copper IUD can actually make menstrual bleeding heavier.  Graphic 59337 Version 15.0  Consumer Information Use and Disclaimer   Disclaimer: This generalized information is a limited summary of diagnosis, treatment, and/or medication information. It is not meant to be comprehensive and should be used as a tool to help the user understand and/or assess potential diagnostic and treatment options. It does NOT include all information about conditions, treatments, medications, side effects, or risks that may apply to a specific patient. It is not intended to be medical advice or a substitute for the medical advice, diagnosis, or treatment of a health care provider based on the health care provider's examination and assessment of a patient's specific and unique circumstances. Patients must speak with a health care provider for  complete information about their health, medical questions, and treatment options, including any risks or benefits regarding use of medications. This information does not endorse any treatments or medications as safe, effective, or approved for treating a specific patient. UpToDate, Inc. and its affiliates disclaim any warranty or liability relating to this information or the use thereof.The use of this information is governed by the Terms of Use, available at https://www.woltersAtlanteTrekuwer.com/en/know/clinical-effectiveness-terms. 2024© UpToDate, Inc. and its affiliates and/or licensors. All rights reserved.  Copyright   © 2024 UpToDate, Inc. and/or its affiliates. All rights reserved.

## 2024-09-16 ENCOUNTER — LAB (OUTPATIENT)
Age: 24
End: 2024-09-16
Payer: COMMERCIAL

## 2024-09-16 ENCOUNTER — NURSE TRIAGE (OUTPATIENT)
Age: 24
End: 2024-09-16

## 2024-09-16 DIAGNOSIS — R39.9 UTI SYMPTOMS: Primary | ICD-10-CM

## 2024-09-16 DIAGNOSIS — R39.9 UTI SYMPTOMS: ICD-10-CM

## 2024-09-16 LAB
BACTERIA UR QL AUTO: ABNORMAL /HPF
BILIRUB UR QL STRIP: NEGATIVE
CLARITY UR: ABNORMAL
COLOR UR: YELLOW
GLUCOSE UR STRIP-MCNC: NEGATIVE MG/DL
HGB UR QL STRIP.AUTO: ABNORMAL
KETONES UR STRIP-MCNC: NEGATIVE MG/DL
LEUKOCYTE ESTERASE UR QL STRIP: ABNORMAL
MUCOUS THREADS UR QL AUTO: ABNORMAL
NITRITE UR QL STRIP: NEGATIVE
NON-SQ EPI CELLS URNS QL MICRO: ABNORMAL /HPF
PH UR STRIP.AUTO: 6 [PH]
PROT UR STRIP-MCNC: ABNORMAL MG/DL
RBC #/AREA URNS AUTO: ABNORMAL /HPF
SP GR UR STRIP.AUTO: 1.01 (ref 1–1.03)
UROBILINOGEN UR STRIP-ACNC: <2 MG/DL
WBC #/AREA URNS AUTO: ABNORMAL /HPF

## 2024-09-16 PROCEDURE — 87077 CULTURE AEROBIC IDENTIFY: CPT

## 2024-09-16 PROCEDURE — 87086 URINE CULTURE/COLONY COUNT: CPT

## 2024-09-16 PROCEDURE — 81001 URINALYSIS AUTO W/SCOPE: CPT

## 2024-09-16 NOTE — TELEPHONE ENCOUNTER
"Patient calling to report UTI symptoms x 1 week. Note burning with urination, mostly at the end of her stream, increased frequency and urgency, low back and pelvic pain as well as darker yellow urin. Orders place for UA/CS per protocol. Reviewed labs sites in patient's area. Message to Namita PALMA for review.    Please order Urinalysis and C&S.      Reason for Disposition  • Urinating more frequently than usual (i.e., frequency)    Answer Assessment - Initial Assessment Questions  1. SYMPTOM: \"What's the main symptom you're concerned about?\" (e.g., frequency, incontinence)      Burning with urination, low back/pelvic, urinary frequency, urinary urgency, dark yellow urine  2. ONSET: \"When did the  s/s  start?\"      1 week  3. PAIN: \"Is there any pain?\" If Yes, ask: \"How bad is it?\" (Scale: 1-10; mild, moderate, severe)      4/10 - tylenol helped a bit  4. CAUSE: \"What do you think is causing the symptoms?\"      Possible UTI  5. OTHER SYMPTOMS: \"Do you have any other symptoms?\" (e.g., fever, flank pain, blood in urine, pain with urination)      Denies fever, body aches, chill - notes some hot flashes  6. PREGNANCY: \"Is there any chance you are pregnant?\" \"When was your last menstrual period?\"      Denies    Protocols used: Urinary Symptoms-ADULT-[place]    "

## 2024-09-18 LAB — BACTERIA UR CULT: ABNORMAL

## 2024-09-19 ENCOUNTER — TELEPHONE (OUTPATIENT)
Age: 24
End: 2024-09-19

## 2024-09-19 DIAGNOSIS — R39.9 UTI SYMPTOMS: Primary | ICD-10-CM

## 2024-09-19 RX ORDER — NITROFURANTOIN 25; 75 MG/1; MG/1
100 CAPSULE ORAL 2 TIMES DAILY
Qty: 10 CAPSULE | Refills: 0 | Status: SHIPPED | OUTPATIENT
Start: 2024-09-19 | End: 2024-09-24

## 2024-09-19 NOTE — TELEPHONE ENCOUNTER
----- Message from MINERVA Swain sent at 9/19/2024  8:27 AM EDT -----  Pls notify her urine cx showed an infection so I will send abx to her pharmacy. She may get a yeast infection from these antibiotics so she should use a monistat 7 treatment if this happens. Thx.

## 2024-09-19 NOTE — RESULT ENCOUNTER NOTE
Pls notify her urine cx showed an infection so I will send abx to her pharmacy. She may get a yeast infection from these antibiotics so she should use a monistat 7 treatment if this happens. Thx.

## 2025-05-17 DIAGNOSIS — Z30.41 ENCOUNTER FOR SURVEILLANCE OF CONTRACEPTIVE PILLS: ICD-10-CM

## 2025-05-19 RX ORDER — NORETHINDRONE ACETATE AND ETHINYL ESTRADIOL AND FERROUS FUMARATE 1MG-20(21)
KIT ORAL
Qty: 112 TABLET | Refills: 1 | Status: SHIPPED | OUTPATIENT
Start: 2025-05-19